# Patient Record
Sex: MALE | Race: WHITE
[De-identification: names, ages, dates, MRNs, and addresses within clinical notes are randomized per-mention and may not be internally consistent; named-entity substitution may affect disease eponyms.]

---

## 2017-09-20 ENCOUNTER — HOSPITAL ENCOUNTER (INPATIENT)
Dept: HOSPITAL 10 - REC | Age: 27
LOS: 1 days | Discharge: HOME | DRG: 520 | End: 2017-09-21
Attending: ORTHOPAEDIC SURGERY | Admitting: ORTHOPAEDIC SURGERY
Payer: COMMERCIAL

## 2017-09-20 VITALS — HEART RATE: 80 BPM | DIASTOLIC BLOOD PRESSURE: 67 MMHG | SYSTOLIC BLOOD PRESSURE: 135 MMHG | RESPIRATION RATE: 14 BRPM

## 2017-09-20 VITALS — HEART RATE: 84 BPM | DIASTOLIC BLOOD PRESSURE: 74 MMHG | RESPIRATION RATE: 18 BRPM | SYSTOLIC BLOOD PRESSURE: 131 MMHG

## 2017-09-20 VITALS — RESPIRATION RATE: 20 BRPM | HEART RATE: 84 BPM | DIASTOLIC BLOOD PRESSURE: 76 MMHG | SYSTOLIC BLOOD PRESSURE: 138 MMHG

## 2017-09-20 VITALS — DIASTOLIC BLOOD PRESSURE: 67 MMHG | SYSTOLIC BLOOD PRESSURE: 120 MMHG | HEART RATE: 82 BPM | RESPIRATION RATE: 11 BRPM

## 2017-09-20 VITALS — DIASTOLIC BLOOD PRESSURE: 74 MMHG | HEART RATE: 86 BPM | RESPIRATION RATE: 12 BRPM | SYSTOLIC BLOOD PRESSURE: 136 MMHG

## 2017-09-20 VITALS — SYSTOLIC BLOOD PRESSURE: 139 MMHG | HEART RATE: 92 BPM | DIASTOLIC BLOOD PRESSURE: 76 MMHG | RESPIRATION RATE: 19 BRPM

## 2017-09-20 VITALS — RESPIRATION RATE: 15 BRPM | HEART RATE: 84 BPM | SYSTOLIC BLOOD PRESSURE: 140 MMHG | DIASTOLIC BLOOD PRESSURE: 73 MMHG

## 2017-09-20 VITALS — DIASTOLIC BLOOD PRESSURE: 64 MMHG | HEART RATE: 80 BPM | RESPIRATION RATE: 12 BRPM | SYSTOLIC BLOOD PRESSURE: 140 MMHG

## 2017-09-20 VITALS — DIASTOLIC BLOOD PRESSURE: 77 MMHG | RESPIRATION RATE: 16 BRPM | HEART RATE: 82 BPM | SYSTOLIC BLOOD PRESSURE: 137 MMHG

## 2017-09-20 VITALS — HEART RATE: 92 BPM | DIASTOLIC BLOOD PRESSURE: 74 MMHG | RESPIRATION RATE: 17 BRPM | SYSTOLIC BLOOD PRESSURE: 136 MMHG

## 2017-09-20 VITALS — DIASTOLIC BLOOD PRESSURE: 74 MMHG | SYSTOLIC BLOOD PRESSURE: 128 MMHG | RESPIRATION RATE: 14 BRPM | HEART RATE: 90 BPM

## 2017-09-20 VITALS — HEART RATE: 84 BPM | DIASTOLIC BLOOD PRESSURE: 67 MMHG | SYSTOLIC BLOOD PRESSURE: 139 MMHG | RESPIRATION RATE: 12 BRPM

## 2017-09-20 VITALS — HEART RATE: 82 BPM | RESPIRATION RATE: 18 BRPM | SYSTOLIC BLOOD PRESSURE: 126 MMHG | DIASTOLIC BLOOD PRESSURE: 76 MMHG

## 2017-09-20 VITALS — SYSTOLIC BLOOD PRESSURE: 129 MMHG | HEART RATE: 88 BPM | RESPIRATION RATE: 15 BRPM | DIASTOLIC BLOOD PRESSURE: 65 MMHG

## 2017-09-20 VITALS — SYSTOLIC BLOOD PRESSURE: 136 MMHG | DIASTOLIC BLOOD PRESSURE: 72 MMHG | RESPIRATION RATE: 14 BRPM | HEART RATE: 80 BPM

## 2017-09-20 VITALS — DIASTOLIC BLOOD PRESSURE: 71 MMHG | RESPIRATION RATE: 15 BRPM | SYSTOLIC BLOOD PRESSURE: 137 MMHG | HEART RATE: 86 BPM

## 2017-09-20 VITALS — HEART RATE: 82 BPM | RESPIRATION RATE: 17 BRPM | DIASTOLIC BLOOD PRESSURE: 72 MMHG | SYSTOLIC BLOOD PRESSURE: 148 MMHG

## 2017-09-20 VITALS — SYSTOLIC BLOOD PRESSURE: 129 MMHG | RESPIRATION RATE: 17 BRPM | DIASTOLIC BLOOD PRESSURE: 62 MMHG | HEART RATE: 78 BPM

## 2017-09-20 VITALS — HEART RATE: 94 BPM | SYSTOLIC BLOOD PRESSURE: 144 MMHG | DIASTOLIC BLOOD PRESSURE: 79 MMHG | RESPIRATION RATE: 24 BRPM

## 2017-09-20 VITALS — SYSTOLIC BLOOD PRESSURE: 123 MMHG | RESPIRATION RATE: 10 BRPM | HEART RATE: 78 BPM | DIASTOLIC BLOOD PRESSURE: 61 MMHG

## 2017-09-20 VITALS — RESPIRATION RATE: 16 BRPM | SYSTOLIC BLOOD PRESSURE: 128 MMHG | DIASTOLIC BLOOD PRESSURE: 75 MMHG | HEART RATE: 84 BPM

## 2017-09-20 VITALS — RESPIRATION RATE: 15 BRPM | SYSTOLIC BLOOD PRESSURE: 134 MMHG | HEART RATE: 86 BPM | DIASTOLIC BLOOD PRESSURE: 67 MMHG

## 2017-09-20 VITALS — DIASTOLIC BLOOD PRESSURE: 62 MMHG | SYSTOLIC BLOOD PRESSURE: 135 MMHG | HEART RATE: 84 BPM | RESPIRATION RATE: 16 BRPM

## 2017-09-20 VITALS — HEART RATE: 99 BPM | DIASTOLIC BLOOD PRESSURE: 100 MMHG | RESPIRATION RATE: 20 BRPM | SYSTOLIC BLOOD PRESSURE: 140 MMHG

## 2017-09-20 VITALS — HEART RATE: 80 BPM | RESPIRATION RATE: 12 BRPM | DIASTOLIC BLOOD PRESSURE: 65 MMHG | SYSTOLIC BLOOD PRESSURE: 124 MMHG

## 2017-09-20 VITALS — SYSTOLIC BLOOD PRESSURE: 144 MMHG | RESPIRATION RATE: 13 BRPM | HEART RATE: 78 BPM | DIASTOLIC BLOOD PRESSURE: 68 MMHG

## 2017-09-20 VITALS — HEART RATE: 78 BPM | SYSTOLIC BLOOD PRESSURE: 133 MMHG | DIASTOLIC BLOOD PRESSURE: 68 MMHG | RESPIRATION RATE: 12 BRPM

## 2017-09-20 VITALS — RESPIRATION RATE: 11 BRPM | SYSTOLIC BLOOD PRESSURE: 127 MMHG | DIASTOLIC BLOOD PRESSURE: 63 MMHG | HEART RATE: 78 BPM

## 2017-09-20 VITALS — RESPIRATION RATE: 11 BRPM | SYSTOLIC BLOOD PRESSURE: 132 MMHG | HEART RATE: 78 BPM | DIASTOLIC BLOOD PRESSURE: 71 MMHG

## 2017-09-20 VITALS — HEART RATE: 80 BPM | SYSTOLIC BLOOD PRESSURE: 136 MMHG | DIASTOLIC BLOOD PRESSURE: 70 MMHG | RESPIRATION RATE: 12 BRPM

## 2017-09-20 VITALS — DIASTOLIC BLOOD PRESSURE: 69 MMHG | RESPIRATION RATE: 13 BRPM | SYSTOLIC BLOOD PRESSURE: 134 MMHG | HEART RATE: 80 BPM

## 2017-09-20 VITALS — RESPIRATION RATE: 16 BRPM | DIASTOLIC BLOOD PRESSURE: 71 MMHG | SYSTOLIC BLOOD PRESSURE: 137 MMHG | HEART RATE: 90 BPM

## 2017-09-20 VITALS
HEIGHT: 71 IN | WEIGHT: 196.87 LBS | BODY MASS INDEX: 27.56 KG/M2 | WEIGHT: 196.87 LBS | BODY MASS INDEX: 27.56 KG/M2 | BODY MASS INDEX: 27.56 KG/M2 | HEIGHT: 71 IN

## 2017-09-20 VITALS — SYSTOLIC BLOOD PRESSURE: 146 MMHG | DIASTOLIC BLOOD PRESSURE: 68 MMHG | HEART RATE: 88 BPM | RESPIRATION RATE: 16 BRPM

## 2017-09-20 VITALS — RESPIRATION RATE: 13 BRPM | HEART RATE: 80 BPM | DIASTOLIC BLOOD PRESSURE: 64 MMHG | SYSTOLIC BLOOD PRESSURE: 116 MMHG

## 2017-09-20 VITALS — HEART RATE: 78 BPM | SYSTOLIC BLOOD PRESSURE: 134 MMHG | DIASTOLIC BLOOD PRESSURE: 74 MMHG | RESPIRATION RATE: 17 BRPM

## 2017-09-20 VITALS — HEART RATE: 90 BPM | SYSTOLIC BLOOD PRESSURE: 134 MMHG | RESPIRATION RATE: 15 BRPM | DIASTOLIC BLOOD PRESSURE: 66 MMHG

## 2017-09-20 VITALS — RESPIRATION RATE: 20 BRPM | DIASTOLIC BLOOD PRESSURE: 75 MMHG | SYSTOLIC BLOOD PRESSURE: 136 MMHG

## 2017-09-20 VITALS — SYSTOLIC BLOOD PRESSURE: 131 MMHG | HEART RATE: 85 BPM | DIASTOLIC BLOOD PRESSURE: 74 MMHG | RESPIRATION RATE: 16 BRPM

## 2017-09-20 VITALS — DIASTOLIC BLOOD PRESSURE: 74 MMHG | HEART RATE: 74 BPM | RESPIRATION RATE: 10 BRPM | SYSTOLIC BLOOD PRESSURE: 133 MMHG

## 2017-09-20 VITALS — HEART RATE: 80 BPM | SYSTOLIC BLOOD PRESSURE: 126 MMHG | DIASTOLIC BLOOD PRESSURE: 65 MMHG | RESPIRATION RATE: 10 BRPM

## 2017-09-20 VITALS — HEART RATE: 88 BPM | DIASTOLIC BLOOD PRESSURE: 75 MMHG | RESPIRATION RATE: 21 BRPM | SYSTOLIC BLOOD PRESSURE: 145 MMHG

## 2017-09-20 VITALS — RESPIRATION RATE: 15 BRPM | SYSTOLIC BLOOD PRESSURE: 132 MMHG | HEART RATE: 78 BPM | DIASTOLIC BLOOD PRESSURE: 70 MMHG

## 2017-09-20 VITALS — RESPIRATION RATE: 14 BRPM | DIASTOLIC BLOOD PRESSURE: 64 MMHG | SYSTOLIC BLOOD PRESSURE: 133 MMHG | HEART RATE: 82 BPM

## 2017-09-20 VITALS — HEART RATE: 82 BPM | DIASTOLIC BLOOD PRESSURE: 73 MMHG | RESPIRATION RATE: 18 BRPM | SYSTOLIC BLOOD PRESSURE: 122 MMHG

## 2017-09-20 VITALS — DIASTOLIC BLOOD PRESSURE: 66 MMHG | RESPIRATION RATE: 16 BRPM | SYSTOLIC BLOOD PRESSURE: 139 MMHG | HEART RATE: 80 BPM

## 2017-09-20 VITALS — RESPIRATION RATE: 20 BRPM | HEART RATE: 74 BPM | SYSTOLIC BLOOD PRESSURE: 138 MMHG | DIASTOLIC BLOOD PRESSURE: 76 MMHG

## 2017-09-20 VITALS — RESPIRATION RATE: 18 BRPM | DIASTOLIC BLOOD PRESSURE: 71 MMHG | SYSTOLIC BLOOD PRESSURE: 123 MMHG | HEART RATE: 81 BPM

## 2017-09-20 VITALS — DIASTOLIC BLOOD PRESSURE: 64 MMHG | RESPIRATION RATE: 18 BRPM | HEART RATE: 80 BPM | SYSTOLIC BLOOD PRESSURE: 127 MMHG

## 2017-09-20 VITALS — DIASTOLIC BLOOD PRESSURE: 68 MMHG | HEART RATE: 85 BPM | SYSTOLIC BLOOD PRESSURE: 121 MMHG | RESPIRATION RATE: 16 BRPM

## 2017-09-20 VITALS — HEART RATE: 80 BPM | SYSTOLIC BLOOD PRESSURE: 132 MMHG | DIASTOLIC BLOOD PRESSURE: 67 MMHG | RESPIRATION RATE: 10 BRPM

## 2017-09-20 VITALS — RESPIRATION RATE: 13 BRPM | DIASTOLIC BLOOD PRESSURE: 68 MMHG | HEART RATE: 84 BPM | SYSTOLIC BLOOD PRESSURE: 141 MMHG

## 2017-09-20 DIAGNOSIS — M51.27: Primary | ICD-10-CM

## 2017-09-20 PROCEDURE — 0SB40ZZ EXCISION OF LUMBOSACRAL DISC, OPEN APPROACH: ICD-10-PCS | Performed by: ORTHOPAEDIC SURGERY

## 2017-09-20 PROCEDURE — 97530 THERAPEUTIC ACTIVITIES: CPT

## 2017-09-20 PROCEDURE — 86900 BLOOD TYPING SEROLOGIC ABO: CPT

## 2017-09-20 PROCEDURE — 97162 PT EVAL MOD COMPLEX 30 MIN: CPT

## 2017-09-20 PROCEDURE — 72020 X-RAY EXAM OF SPINE 1 VIEW: CPT

## 2017-09-20 PROCEDURE — 80048 BASIC METABOLIC PNL TOTAL CA: CPT

## 2017-09-20 PROCEDURE — 86920 COMPATIBILITY TEST SPIN: CPT

## 2017-09-20 PROCEDURE — 97116 GAIT TRAINING THERAPY: CPT

## 2017-09-20 PROCEDURE — 85018 HEMOGLOBIN: CPT

## 2017-09-20 PROCEDURE — 85014 HEMATOCRIT: CPT

## 2017-09-20 PROCEDURE — 0QN00ZZ RELEASE LUMBAR VERTEBRA, OPEN APPROACH: ICD-10-PCS | Performed by: ORTHOPAEDIC SURGERY

## 2017-09-20 PROCEDURE — 86850 RBC ANTIBODY SCREEN: CPT

## 2017-09-20 PROCEDURE — 86901 BLOOD TYPING SEROLOGIC RH(D): CPT

## 2017-09-20 RX ADMIN — Medication SCH MG: at 17:41

## 2017-09-20 RX ADMIN — Medication SCH MG: at 09:13

## 2017-09-20 RX ADMIN — CEFAZOLIN SCH MLS/HR: 1 INJECTION, POWDER, FOR SOLUTION INTRAMUSCULAR; INTRAVENOUS at 17:52

## 2017-09-20 RX ADMIN — PHENOL PRN LOZENGE: 14.5 LOZENGE ORAL at 21:53

## 2017-09-20 RX ADMIN — CEFAZOLIN SCH MLS/HR: 1 INJECTION, POWDER, FOR SOLUTION INTRAMUSCULAR; INTRAVENOUS at 23:46

## 2017-09-20 RX ADMIN — FOLIC ACID SCH MLS/HR: 5 INJECTION, SOLUTION INTRAMUSCULAR; INTRAVENOUS; SUBCUTANEOUS at 15:27

## 2017-09-20 RX ADMIN — DEXAMETHASONE SODIUM PHOSPHATE PRN MG: 10 INJECTION, SOLUTION INTRAMUSCULAR; INTRAVENOUS at 16:05

## 2017-09-20 RX ADMIN — PHENOL PRN LOZENGE: 14.5 LOZENGE ORAL at 15:27

## 2017-09-20 RX ADMIN — DEXAMETHASONE SODIUM PHOSPHATE PRN MG: 10 INJECTION, SOLUTION INTRAMUSCULAR; INTRAVENOUS at 09:08

## 2017-09-20 RX ADMIN — FOLIC ACID SCH MLS/HR: 5 INJECTION, SOLUTION INTRAMUSCULAR; INTRAVENOUS; SUBCUTANEOUS at 18:33

## 2017-09-20 RX ADMIN — RANITIDINE SCH MG: 150 TABLET ORAL at 21:53

## 2017-09-20 RX ADMIN — DEXAMETHASONE SODIUM PHOSPHATE PRN MG: 10 INJECTION, SOLUTION INTRAMUSCULAR; INTRAVENOUS at 11:43

## 2017-09-20 RX ADMIN — CEFAZOLIN SCH MLS/HR: 1 INJECTION, POWDER, FOR SOLUTION INTRAMUSCULAR; INTRAVENOUS at 12:00

## 2017-09-20 NOTE — OPR
DATE OF OPERATION:  09/20/2017

 

PREOPERATIVE DIAGNOSIS:  Herniated disk, L5, S1, on the right.

 

POSTOPERATIVE DIAGNOSIS:  Herniated disk, L5, S1, on the right.

 

OPERATION PERFORMED:

1.   Right hemilaminotomy, L5.

2.   Microdiskectomy, L5-S1, on the right.

3.   Medial facetectomy foraminotomy, L5-S1, on the right.

4.   Cosmetic wound closure (2.6 cm).

5.   Lateral localized lumbar radiographs (2).

6.   Intraoperative nerve monitoring (1 hour).

 

SURGEON:  Dr. Contreras.

 

ASSISTANT:  JOSE LUIS Rojas.

 

ANESTHESIA:  General endotracheal by Dr. Thomason.

 

ESTIMATED BLOOD LOSS:  15 cc-none replaced.

 

DRAINS:  Two medium Hemovac drains employed.

 

COMPLICATIONS:  No complications.

 

PERTINENT HISTORY AND PHYSICAL:  This is a 27-year-old male with

persistent back and right leg pain, which has been unrelieved by

conservative management following an injury to his back, which

occurred in the course of his employment on January 12, 2017.  He

has had appropriate conservativecare since that time.  He has

remained symptomatic.  He has undergone a number of diagnostic

studies including an MRI of lumbar spine, which demonstrated

herniation of the L5-S1 disk on the right.  Treatment options

were discussed with the patient.  He elected to surgery.

 

OPERATIVE FINDINGS AT SURGERY:  A small-to-moderate right

paracentral herniation of the L5-S1 disk was confirmed.  The

baseline intraoperative nerve monitor revealed a decrease in the

right S1 potential of 30 percent and the right L5 potential of 30

percent.  These both returned to normal at the completion of the

surgery.

 

OPERATIVE PROCEDURE:  With the patient in supine position, after

satisfactory induction of general trach anesthesia by Dr. Thomason, the patient was turned to the prone kneeling position

onto the [place] frame.  All pressure points carefully padded.

The back was prepped and draped in usual sterile fashion.

Athrombic pumps were applied to the legs below the knees and

venous stasis during and after the procedure.  Two spinal needles

placed next to what was felt to be the L5 and S1 spinous

processes, lateral roentgenogram was taken, which confirmed

anatomical localization.  A 2.6 cm incision was then carried out

in the midline over the spinous process of L5 after the skin was

infiltrated with 0.25 percent Marcaine without epinephrine for

postoperative analgesia.  Superficial retractors were placed and

hemostasis secured with electrocautery.  Throughout the procedure

copious amounts of antibacterial irrigating solution used to

periodically irrigate the wound.  The fascia was incised in the

midline with a hot knife and a unilateral subperiosteal

dissection carried out at L5-S1 on the right.  Deep retractors

were placed and deep hemostasis secured with electrocautery.  A

2nd intraoperative radiograph was taken where the deep tract was

felt to be the L5-S1 disk space and this was confirmed with a 2nd

x-ray.  A right hemilaminotomy was then carried out using a

[place] rongeur, Kerrison punches and curettes.  Ligamentum

flavum was excised with sharp dissection.  The operating

microscope then moved into place.  A medial facetectomy and

foraminotomy were accomplished using small hand osteotome and

mallet, Kerrison punches and curettes.  The S1 root was then

mobilized medially and protected with a D'Rosita nerve root

retractor using microdissection technique.  This revealed a

herniation of the L5-S1 disk on the right.  A 15 blade knife used

to cut a rectangular annular  window in the annulus and posterior

longitudinal ligament, and multiple degenerative disk fragments

were harvested with a pituitary rongeur and sent to the

laboratory for pathologic study.  Additional fragments were

harvested using Viviana curettes.  A thorough search of the floor

of canal was made with an arthroscopic probe, and no additional

fragments were encountered.  The epidural hemostasis was secured

with bipolar electrocautery on a low setting.  The

anesthesiologist asked to perform a Valsalva maneuver, 40 mmHg,

no spinal fluid leakage was noted.  The wound was then closed in

layers over 2 medium Hemovac drains, one below the fascia, one

above the fascia using #1 Vicryl figure-of-eight approximating

suture in the deep parietal musculature and deep fascia of the

back.  2-0 Vicryl subcutaneous approximating sutures in the subcu

tissue and a 4-0 Vicryl subcuticular cosmetic closing suture on

the skin.  Dermabond and sterile compressive dressings were

applied.  The patient having tolerated procedure well was then

turned in the supine position onto his bed and extubated by Dr. Thomason.  He was transported to recovery room in satisfactory

condition.  At the conclusion of procedure, sponge, instrument,

needle counts were all correct.

 

My assistant was required to retract nerves, suction blood, and assist in the 
exposure of the neural elements. These tasks can only be performed by licensed 
personnel in accordance with hospital bylaws and [place] law.

 

Throughout the procedure, neuromonitoring was carried out by

[employer] including EMG, SSEP and MEP

monitoring of the L3, L4, L5 and S1 nerve roots bilaterally along

with spinal cord potentials.  These were interpreted by a

neurologist employed by [employer].

 

 

 

Dictated By:  Rico Contreras MD

 

/jayme/uriel

Job#:  [id number]/Document#:  [id number]

D:  09/20/2017 08:51

T:  09/20/2017 10:08

CC:  DO KEN Nguyen

## 2017-09-20 NOTE — RADRPT
PROCEDURE:   XR Lumbar Spine. 

 

CLINICAL INDICATION:   Lower back Pain. L5-S1 microdiskectomy

 

TECHNIQUE:  An AP view of the lumbar spine was obtained.

 

COMPARISON:   No prior studies are available for comparison. 

 

FINDINGS:

Alignment is anatomic.  No fracture or dislocation.  Diffuse degenerative disk disease is noted.  Po
stsurgical changes are present.

There are probes directed at the L4-5 and L5-S1 levels

 

IMPRESSION:

Intraoperative localization film.  Anatomic alignment.

 

RPTAT: QQ

_____________________________________________ 

.Veronica Agosto MD, MD           Date    Time 

Electronically viewed and signed by .Veronica Agosto MD, MD on 09/20/2017 12:28 

 

D:  09/20/2017 12:28  T:  09/20/2017 12:28

.F/

## 2017-09-20 NOTE — SIPON
Date/Time of Note


Date/Time of Note


DATE: 9/20/17 


TIME: 08:36





Operative Report


Preoperative Diagnosis


Herniated disc L5-S1 on the right


Postoperative Diagnosis


Same


Operation/Procedure Performed


Right hemilaminotomy L5


Micro- discectomy L5-S1 on the right


Medial facetectomy and foraminotomy L5-S1 on the right


Cosmetic wound closure 2.6 cm


Lateral localizing lumbar radiographs (2)


Surgeon


see signature line


First assist


Shruthi Mendoza RNFA


Anesthesia:  general


Estimated blood loss:  10 - 50 ml's


Transfusion Required


   none


Specimen


HNP L5-S1 right


Grafts/Implants


none


Complications


none











OMAR GOODWIN MD Sep 20, 2017 08:44

## 2017-09-20 NOTE — RADRPT
PROCEDURE:   XR Lumbar Spine. 

 

CLINICAL INDICATION:   Lower back Pain. L5-S1 microdiskectomy

 

TECHNIQUE:  An AP view of the lumbar spine was obtained.

 

COMPARISON:   Intraoperative localization film from the same day 

 

FINDINGS:

Alignment is anatomic.  No fracture or dislocation.  Diffuse degenerative disk disease is noted.  Po
stsurgical changes are present.

There are skin spreaders at the L5-S1 level

 

IMPRESSION:

Intraoperative localization film.  Anatomic alignment.

 

RPTAT: QQ

_____________________________________________ 

.Veronica Agosto MD, MD           Date    Time 

Electronically viewed and signed by .Veronica Agosto MD, MD on 09/20/2017 12:40 

 

D:  09/20/2017 12:40  T:  09/20/2017 12:40

.F/

## 2017-09-20 NOTE — PN
Date/Time of Note


Date/Time of Note


DATE: 9/20/17 


TIME: 17:42





Assessment/Plan


VTE Prophylaxis


VTE Prophylaxis Intervention:  ambulation, SCD's





Lines/Catheters


IV Catheter Type (from Nrsg):  Peripheral IV


Urinary Cath still in place:  No





Assessment/Plan


Chief Complaint/Hosp Course


S/p L5-S1 microdiscectomy


Problems:  


Assessment/Plan


- continue pain management 


- continue PT


- hemovac removal as per surgery


- surgery will follow up for post op care





Subjective


24 Hr Interval Summary


Free Text/Dictation


Medical team was requested by Dr. Contreras for this 28 y/o pleasant male with 

no significant past medical history, who is s/p  L5-S1 microdiscectomy earlier 

this morning. He is resting in be comfortably. Has no complaints. Notes 0/10 

pain at rest, and up to a 5/10 pain with movement. He is able to urinate 

freely. Hemovac shows no active blood drainage with empty reservoir. PT has 

been by twice and the patient was able to get up with assistance. He notes 

nausea and episode of bilious vomiting earlier today. He denies abdominal pain. 

He does note mild dizziness when he was upright. He denies headache, vision or 

hearing changes. No reported BMs yet.


Constitutional:  improved, no complaints


Eyes:  no complaints


ENT:  no complaints


Respiratory:  no complaints


Cardiovascular:  no complaints


Gastrointestinal:  no complaints


Genitourinary:  no complaints


Musculoskeletal:  no complaints


Skin:  no complaints


Neurologic:  no complaints


Endocrine:  no complaints


Lymphatic:  no complaints


Psychological:  nl mood/affect, no complaints


Immunologic:  no complaints





Exam/Review of Systems


Vital Signs


Vitals





 Vital Signs








  Date Time  Temp Pulse Resp B/P Pulse Ox O2 Delivery O2 Flow Rate FiO2


 


9/20/17 15:58 98.2 84 16 128/75 98 Room Air  


 


9/20/17 08:59       10.0 











Exam


Constitutional:  alert, oriented, well developed


Psych:  nl mood/affect, no complaints


Head:  atraumatic, normocephalic


Eyes:  EOMI, PERRL, nl conjunctiva, nl lids, nl sclera


ENMT:  nl external ears & nose, nl lips & teeth, nl nasal mucosa & septum


Neck:  non-tender, supple


Respiratory:  clear to auscultation, normal air movement


Cardiovascular:  nl pulses, regular rate and rhythm


Gastrointestinal:  nl liver, spleen, non-tender, soft


Musculoskeletal:  nl extremities to inspection, other (mild hypoesthesia in the 

right foot.), 


   No muscle weakness


Extremities:  normal pulses


Neurological:  CNS II-XII intact, nl mental status, nl speech, nl strength


Skin:  nl turgor, 


   No rash or lesions


Lymph:  nl lymph nodes





Medications


Medications





 Current Medications


Dextrose/Sodium Chloride (D5-1/2ns) 1,000 ml @  100 mls/hr Q10H IV  Last 

administered on 9/20/17at 15:27; Admin Dose 100 MLS/HR;  Start 9/20/17 at 08:33


Acetaminophen/ Hydrocodone Bitart (Norco (5/325)) 1 tab Q4H  PRN PO PAIN LEVEL 1

-5;  Start 9/20/17 at 09:00


Acetaminophen/ Hydrocodone Bitart 2 tab 2 tab Q4H  PRN PO PAIN LEVEL 6-10;  

Start 9/20/17 at 09:00


Cefazolin Sodium (Ancef 1 Gm/50 ml (Pmx)) 50 ml @  100 mls/hr Q6 IVPB ;  Start 9 /20/17 at 12:00;  Stop 9/21/17 at 06:29


Zolpidem Tartrate (Ambien) 5 mg HS  PRN PO INSOMNIA;  Start 9/20/17 at 09:00


Prochlorperazine (Compazine) 10 mg Q4H  PRN PO NAUSEA AND/OR VOMITING;  Start 9/ 20/17 at 09:00


Trimethobenzamide HCl (Tigan) 200 mg Q4H  PRN IM NAUSEA AND/OR VOMITING;  Start 

9/20/17 at 09:00


Ondansetron HCl (Zofran Inj) 4 mg Q6H  PRN IV NAUSEA AND/OR VOMITING Last 

administered on 9/20/17at 16:05; Admin Dose 4 MG;  Start 9/20/17 at 09:00


Al Hydrox/Mg Hydrox/Simethicone (Mag-Al Plus) 15 ml Q4H  PRN PO CONSTIPATION;  

Start 9/20/17 at 09:00


Docusate Sodium (Colace) 100 mg BID PO ;  Start 9/21/17 at 09:00


Acetaminophen (Tylenol Tab) 650 mg Q4H  PRN PO TEMP GREATER THAN 101F OR HA;  

Start 9/20/17 at 09:00


Ascorbic Acid (Vitamin C) 1,000 mg BID PO ;  Start 9/21/17 at 09:00


Ferrous Sulfate (Ferrous Sulfate (Ec)) 325 mg TID PO ;  Start 9/21/17 at 09:00


Ranitidine HCl (Zantac) 150 mg BID PO ;  Start 9/20/17 at 21:00


Diazepam (Valium) 5 mg Q4H  PRN PO MUSCLE SPASMS;  Start 9/20/17 at 09:00


Diazepam (Valium) 5 mg Q4H  PRN IM MUSCLE SPASMS;  Start 9/20/17 at 09:00


Phenol (Cepastat Lozenge) 1 lozenge PRN  PRN MT SORE THROAT Last administered 

on 9/20/17at 15:27; Admin Dose 1 LOZENGE;  Start 9/20/17 at 09:00


Bethanechol Chloride (Urecholine) 25 mg PRN  PRN PO UNABLE TO VOID;  Start 9/20/ 17 at 09:00


Diphenhydramine HCl (Benadryl) 50 mg Q6H  PRN PO PRURITUS;  Start 9/20/17 at 09:

00


Hydromorphone HCl (Dilaudid PCA)  Q4PCA IV  Last administered on 9/20/17at 17:41

; Admin Dose 6 MG;  Start 9/20/17 at 09:00


Naloxone HCl (Narcan) 0.2 mg Q2M  PRN IV RR 8 BREATHS/MIN OR LESS;  Start 9/20/ 17 at 09:00











ENEDELIA BRAXTON Sep 20, 2017 17:53

## 2017-09-21 VITALS — RESPIRATION RATE: 19 BRPM | DIASTOLIC BLOOD PRESSURE: 61 MMHG | SYSTOLIC BLOOD PRESSURE: 136 MMHG

## 2017-09-21 VITALS — DIASTOLIC BLOOD PRESSURE: 56 MMHG | RESPIRATION RATE: 18 BRPM | SYSTOLIC BLOOD PRESSURE: 112 MMHG

## 2017-09-21 VITALS — HEART RATE: 80 BPM | RESPIRATION RATE: 17 BRPM | SYSTOLIC BLOOD PRESSURE: 112 MMHG | DIASTOLIC BLOOD PRESSURE: 80 MMHG

## 2017-09-21 LAB
ANION GAP SERPL CALC-SCNC: 13 MMOL/L (ref 8–16)
BUN SERPL-MCNC: 10 MG/DL (ref 7–20)
CALCIUM SERPL-MCNC: 9 MG/DL (ref 8.4–10.2)
CHLORIDE SERPL-SCNC: 104 MMOL/L (ref 97–110)
CO2 SERPL-SCNC: 28 MMOL/L (ref 21–31)
CREAT SERPL-MCNC: 0.85 MG/DL (ref 0.61–1.24)
GLUCOSE SERPL-MCNC: 119 MG/DL (ref 70–220)
HCT VFR BLD CALC: 38.6 % (ref 42–52)
HGB BLD-MCNC: 13.1 G/DL (ref 14–18)
POTASSIUM SERPL-SCNC: 4.2 MMOL/L (ref 3.5–5.1)
SODIUM SERPL-SCNC: 141 MMOL/L (ref 135–144)

## 2017-09-21 RX ADMIN — CEFAZOLIN SCH MLS/HR: 1 INJECTION, POWDER, FOR SOLUTION INTRAMUSCULAR; INTRAVENOUS at 06:06

## 2017-09-21 RX ADMIN — RANITIDINE SCH MG: 150 TABLET ORAL at 09:26

## 2017-09-21 RX ADMIN — Medication SCH MG: at 04:57

## 2017-09-21 RX ADMIN — FOLIC ACID SCH MLS/HR: 5 INJECTION, SOLUTION INTRAMUSCULAR; INTRAVENOUS; SUBCUTANEOUS at 14:33

## 2017-09-21 RX ADMIN — HYDROCODONE BITARTRATE AND ACETAMINOPHEN PRN TAB: 5; 325 TABLET ORAL at 13:28

## 2017-09-21 RX ADMIN — FERROUS SULFATE TAB 325 MG (65 MG ELEMENTAL FE) SCH MG: 325 (65 FE) TAB at 13:28

## 2017-09-21 RX ADMIN — FERROUS SULFATE TAB 325 MG (65 MG ELEMENTAL FE) SCH MG: 325 (65 FE) TAB at 09:27

## 2017-09-21 RX ADMIN — HYDROCODONE BITARTRATE AND ACETAMINOPHEN PRN TAB: 5; 325 TABLET ORAL at 09:27

## 2017-09-21 RX ADMIN — FOLIC ACID SCH MLS/HR: 5 INJECTION, SOLUTION INTRAMUSCULAR; INTRAVENOUS; SUBCUTANEOUS at 03:08

## 2017-09-21 NOTE — PN
Date/Time of Note


Date/Time of Note


DATE: 9/21/17 


TIME: 07:11





Assessment/Plan


Lines/Catheters


IV Catheter Type (from Nrsg):  Peripheral IV


Caballero in Place (from Nrsg):  No





Subjective


24 Hr Interval Summary


Patient is POD #1 from lumbar microdiscectomy L5-S1.  He is doing well and has 

been ambulatory with PT. Neurovascular structures are intact distally. Vital 

signs are stable.  Hemoglobin this morning is 13.1.  Hemovac drain overnight 

was 60 cc, this will be monitored during the day.  Plan for today is to 

mobilize with PT, remove PCA and possible discharge later today.





Exam/Review of Systems


Vital Signs


Vitals





 Vital Signs








  Date Time  Temp Pulse Resp B/P Pulse Ox O2 Delivery O2 Flow Rate FiO2


 


9/21/17 05:01   18     


 


9/21/17 02:00 98.5 82  112/56 98   


 


9/21/17 00:01      Room Air  


 


9/20/17 08:59       10.0 














 Intake and Output   


 


 9/20/17 9/20/17 9/21/17





 15:00 23:00 07:00


 


Intake Total 1000 ml 820 ml 1750 ml


 


Output Total 20 ml 800 ml 1660 ml


 


Balance 980 ml 20 ml 90 ml











Results


Result Diagram:  


9/21/17 0440                                                                   

             9/21/17 0440














TYREE DIAZ Sep 21, 2017 07:15

## 2018-09-15 ENCOUNTER — HOSPITAL ENCOUNTER (EMERGENCY)
Dept: HOSPITAL 91 - FTE | Age: 28
Discharge: HOME | End: 2018-09-15
Payer: MEDICAID

## 2018-09-15 ENCOUNTER — HOSPITAL ENCOUNTER (EMERGENCY)
Age: 28
Discharge: HOME | End: 2018-09-15

## 2018-09-15 DIAGNOSIS — M54.5: Primary | ICD-10-CM

## 2018-09-15 PROCEDURE — 96372 THER/PROPH/DIAG INJ SC/IM: CPT

## 2018-09-15 PROCEDURE — 72131 CT LUMBAR SPINE W/O DYE: CPT

## 2018-09-15 PROCEDURE — 99285 EMERGENCY DEPT VISIT HI MDM: CPT

## 2018-09-15 RX ADMIN — METHOCARBAMOL 1 MG: 750 TABLET ORAL at 14:53

## 2018-09-15 RX ADMIN — KETOROLAC TROMETHAMINE 1 MG: 30 INJECTION, SOLUTION INTRAMUSCULAR at 14:36

## 2020-03-20 NOTE — HPN
Date/Time of Note


Date/Time of Note


DATE: 9/20/17 


TIME: 06:45





Interval H&P Admission Note


Pt. seen H&P reviewed:  No system changes











OMAR GOODWIN MD Sep 20, 2017 06:46
no